# Patient Record
Sex: MALE | Race: WHITE
[De-identification: names, ages, dates, MRNs, and addresses within clinical notes are randomized per-mention and may not be internally consistent; named-entity substitution may affect disease eponyms.]

---

## 2019-03-20 ENCOUNTER — HOSPITAL ENCOUNTER (OUTPATIENT)
Dept: HOSPITAL 76 - DI.WCP | Age: 29
Discharge: HOME | End: 2019-03-20
Attending: FAMILY MEDICINE
Payer: COMMERCIAL

## 2019-03-20 DIAGNOSIS — M25.561: Primary | ICD-10-CM

## 2019-03-20 DIAGNOSIS — M75.22: ICD-10-CM

## 2019-03-20 DIAGNOSIS — M75.22: Primary | ICD-10-CM

## 2019-03-20 NOTE — XRAY REPORT
Reason:  BICEPS TENDINITIS,LEFT

Procedure Date:  03/20/2019   

Accession Number:  [id number] / [id number]                    

Procedure:  WCP - Shoulder 2 View LT CPT Code:  

 

FULL RESULT:

 

 

EXAM:

LEFT SHOULDER RADIOGRAPHY

 

EXAM DATE: 3/20/2019 09:08 AM.

 

CLINICAL HISTORY: Left biceps tendinitis.

 

COMPARISON: None.

 

TECHNIQUE: 2 views.

 

FINDINGS:

Bones: Normal. No fracture or bone lesion.

 

Joints: The glenohumeral and acromioclavicular joints are normal.

 

Soft tissues: The visualized hemithorax is unremarkable. No soft tissue 

swelling.

IMPRESSION: Normal shoulder radiography.

 

[facility]

## 2019-03-20 NOTE — XRAY REPORT
Reason:  KNEE JOINT PAIN,RIGHT

Procedure Date:  03/20/2019   

Accession Number:  [id number] / [id number]                    

Procedure:  WCP - Knee 2 View RT CPT Code:  

 

FULL RESULT:

 

 

EXAM:

RIGHT KNEE RADIOGRAPHY

 

EXAM DATE: 3/20/2019 09:08 AM.

 

CLINICAL HISTORY: Right knee joint pain.

 

COMPARISON: None.

 

TECHNIQUE: 2 views.

 

FINDINGS:

Bones: Normal. No fractures or bone lesions.

 

Joints: Normal. No effusion. No subluxations.

 

Soft Tissues: Normal. No soft tissue swelling.

IMPRESSION: Normal knee radiography.

 

[facility]

## 2019-04-04 ENCOUNTER — HOSPITAL ENCOUNTER (OUTPATIENT)
Dept: HOSPITAL 76 - DI | Age: 29
Discharge: HOME | End: 2019-04-04
Attending: FAMILY MEDICINE
Payer: COMMERCIAL

## 2019-04-04 DIAGNOSIS — N50.3: Primary | ICD-10-CM

## 2019-04-04 PROCEDURE — 76870 US EXAM SCROTUM: CPT

## 2019-04-04 PROCEDURE — 93975 VASCULAR STUDY: CPT

## 2019-04-08 NOTE — ULTRASOUND REPORT
Reason:  EPIDIDYMAL CYST, COLLES' FRACTURE OF LEFT RADIUS

Procedure Date:  04/04/2019   

Accession Number:  [id number] / [id number]                    

Procedure:  US  - Testicle w/Doppler CPT Code:  

 

FULL RESULT:

 

 

EXAM:

SCROTAL ULTRASOUND

 

EXAM DATE: 4/4/2019 09:05 AM.

 

CLINICAL HISTORY: EPIDIDYMAL CYST, COLLES FRACTURE OF LEFT RADIUS.

 

COMPARISON: None.

 

TECHNIQUE: Real-time scanning was performed with static images obtained. 

Both color-flow and Doppler spectral analysis were utilized.

 

FINDINGS:

Right:

Testis: 4.1 x 1.9 x 2.6 cm. Normal size and echotexture. No mass, 

calcification, or abnormal blood flow.

Epididymis: There is a small simple epididymal head cyst. Epididymal 

echotexture otherwise within normal limits.

Hydrocele: None.

Varicocele: None.

 

Left:

Testis: 4.0 x 2.0 x 2.5 cm. Normal size and echotexture. No mass, 

calcification, or abnormal blood flow.

Epididymis:  There is a small epididymal head cyst.

Hydrocele: None.

Varicocele: None.

IMPRESSION:

1. The testes are normal in size, echotexture, and vascularity.

2. There are small bilateral epididymal head cysts.

 

[facility]

## 2020-09-07 ENCOUNTER — HOSPITAL ENCOUNTER (EMERGENCY)
Dept: HOSPITAL 76 - ED | Age: 30
Discharge: HOME | End: 2020-09-07
Payer: COMMERCIAL

## 2020-09-07 VITALS — DIASTOLIC BLOOD PRESSURE: 76 MMHG | SYSTOLIC BLOOD PRESSURE: 132 MMHG
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
## 2020-09-07 DIAGNOSIS — S41.151A: Primary | ICD-10-CM
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
## 2020-09-07 DIAGNOSIS — S81.851A: ICD-10-CM

## 2020-09-07 PROCEDURE — 99284 EMERGENCY DEPT VISIT MOD MDM: CPT

## 2020-09-07 PROCEDURE — 99282 EMERGENCY DEPT VISIT SF MDM: CPT

## 2020-09-07 NOTE — ED PHYSICIAN DOCUMENTATION
History of Present Illness





- Stated complaint


Stated Complaint: DOG BITE R ARM





- Chief complaint


Chief Complaint: Wound





- History obtained from


History obtained from: Patient





- History of Present Illness


Timing: Today


Pain level max: 4


Pain level now: 3





- Additonal information


Additional information: 





29-year-old male states that he was bit by a friend's dog today.  The dog bit 

him in the right leg and right arm.  Small amount of bleeding.  Vaccinations are

up-to-date.  Patient's tetanus is up-to-date. Nothing makes it better or worse





Review of Systems


Constitutional: denies: Fever, Chills


Respiratory: denies: Cough


GI: denies: Vomiting


Skin: denies: Rash


Musculoskeletal: denies: Neck pain, Back pain


Neurologic: denies: Headache





PD PAST MEDICAL HISTORY





- Past Medical History


Past Medical History: No





- Past Surgical History


Past Surgical History: No





- Present Medications


Home Medications: 


                                Ambulatory Orders











 Medication  Instructions  Recorded  Confirmed


 


Ibuprofen [Motrin] 400 mg PO Q6H PRN #30 tablet 06/30/16 07/16/16


 


Amoxicillin 500 mg PO TID 07/16/16 07/16/16


 


Clindamycin [Cleocin] 300 mg PO Q6H 10 Days  capsule 07/17/16 


 


oxyCODONE [Roxicodone] 5 mg PO Q4-6H #10 tablet 07/17/16 


 


Amox/Clav 875/125 [Augmentin] 1 each PO Q12H #20 tablet 09/07/20 














- Allergies


Allergies/Adverse Reactions: 


                                    Allergies











Allergy/AdvReac Type Severity Reaction Status Date / Time


 


No Known Drug Allergies Allergy   Verified 09/07/20 11:22














- Social History


Does the pt smoke?: No


Smoking Status: Never smoker


Does the pt drink ETOH?: Yes


Does the pt have substance abuse?: No





- Immunizations


Immunizations are current?: Yes





PD ED PE NORMAL





- Vitals


Vital signs reviewed: Yes





- General


General: Alert and oriented X 3, No acute distress





- HEENT


HEENT: Moist mucous membranes





- Derm


Derm: Warm and dry





- Extremities


Extremities: Other (Multiple abrasions to the right upper arm.  2 small puncture

 wounds that are minimally bleeding.  There is a small puncture wound on the ri

ght calf as well.)





- Neuro


Neuro: Alert and oriented X 3





Results





- Vitals


Vitals: 





                               Vital Signs - 24 hr











  09/07/20





  11:18


 


Temperature 36.8 C


 


Heart Rate 90


 


Respiratory 20





Rate 


 


Blood Pressure 135/85 H


 


O2 Saturation 99








                                     Oxygen











O2 Source                      Room air

















PD MEDICAL DECISION MAKING





- ED course


Complexity details: considered differential, d/w patient


ED course: 





No lacerations that require repair.  Wounds were cleansed, bandaged.  Irrigated.

  Bacitracin applied.  Tetanus up-to-date.  Will place on Augmentin.  Warnings 

of infection and instructions on wound care given at bedside. Also counseled on 

how to minimize scarring.  Patient and family counseled regarding signs and 

symptoms for which I believe and urgent re-evaluation would be necessary. 

Patient with good understanding of and agreement to plan and is comfortable 

going home at this time





This document was made in part using voice recognition software. While efforts 

are made to proofread this document, sound alike and grammatical errors may 

occur.





Departure





- Departure


Disposition: 01 Home, Self Care


Clinical Impression: 


Dog bite


Qualifiers:


 Encounter type: initial encounter Qualified Code(s): W54.0XXA - Bitten by dog, 

initial encounter





Condition: Good


Instructions:  ED Bite Animal General


Follow-Up: 


Merrick Thomas MD [Primary Care Provider] - Within 3 Days


Prescriptions: 


Amox/Clav 875/125 [Augmentin] 1 each PO Q12H #20 tablet


Comments: 


Take all antibiotics until gone.  Return if you worsen.  Follow-up with your 

doctor in 3 to 4 days for a wound check.  Return if you notice redness, swelling

 or drainage from the wound.

## 2020-11-02 ENCOUNTER — HOSPITAL ENCOUNTER (OUTPATIENT)
Dept: HOSPITAL 76 - DI | Age: 30
Discharge: HOME | End: 2020-11-02
Attending: FAMILY MEDICINE
Payer: COMMERCIAL

## 2020-11-02 DIAGNOSIS — R05: Primary | ICD-10-CM

## 2020-11-02 PROCEDURE — 71046 X-RAY EXAM CHEST 2 VIEWS: CPT

## 2020-11-02 NOTE — XRAY REPORT
PROCEDURE:  Chest 2 View X-Ray

 

INDICATIONS:  PRODUCTIVE COUGH

 

TECHNIQUE:  2 view(s) of the chest.  

 

COMPARISON:  None.

 

FINDINGS:  

 

Surgical changes and devices:  None.  

 

Lungs and pleura:  No pleural effusions or pneumothorax.  Lungs are clear.  

 

Mediastinum:  Mediastinal contours are normal.  Heart size is normal.  

 

Bones and chest wall:  No suspicious bony abnormalities.  Soft tissues appear unremarkable.  

 

IMPRESSION:  No acute pulmonary process.

 

Reviewed by: Blaire Macias MD on 11/2/2020 5:29 PM PST

Approved by: Blaire Macias MD on 11/2/2020 5:29 PM PST

 

 

Station ID:  [id number]